# Patient Record
Sex: MALE | Race: WHITE | NOT HISPANIC OR LATINO | Employment: OTHER | URBAN - METROPOLITAN AREA
[De-identification: names, ages, dates, MRNs, and addresses within clinical notes are randomized per-mention and may not be internally consistent; named-entity substitution may affect disease eponyms.]

---

## 2018-05-08 ENCOUNTER — OFFICE VISIT (OUTPATIENT)
Dept: UROLOGY | Facility: CLINIC | Age: 73
End: 2018-05-08
Payer: MEDICARE

## 2018-05-08 VITALS
WEIGHT: 205 LBS | HEART RATE: 80 BPM | HEIGHT: 68 IN | BODY MASS INDEX: 31.07 KG/M2 | SYSTOLIC BLOOD PRESSURE: 110 MMHG | DIASTOLIC BLOOD PRESSURE: 64 MMHG

## 2018-05-08 DIAGNOSIS — R35.0 URINARY FREQUENCY: Primary | ICD-10-CM

## 2018-05-08 DIAGNOSIS — R39.15 URINARY URGENCY: ICD-10-CM

## 2018-05-08 DIAGNOSIS — R35.1 NOCTURIA: ICD-10-CM

## 2018-05-08 LAB
BILIRUB SERPL-MCNC: NORMAL MG/DL
BLOOD URINE, POC: NORMAL
COLOR, POC UA: YELLOW
GLUCOSE UR QL STRIP: NORMAL
KETONES UR QL STRIP: NORMAL
LEUKOCYTE ESTERASE URINE, POC: NORMAL
NITRITE, POC UA: NORMAL
PH, POC UA: 7
PROTEIN, POC: NORMAL
SPECIFIC GRAVITY, POC UA: 1.01
UROBILINOGEN, POC UA: NORMAL

## 2018-05-08 PROCEDURE — 99213 OFFICE O/P EST LOW 20 MIN: CPT | Mod: PBBFAC,PO | Performed by: UROLOGY

## 2018-05-08 PROCEDURE — 81002 URINALYSIS NONAUTO W/O SCOPE: CPT | Mod: PBBFAC,PO | Performed by: UROLOGY

## 2018-05-08 PROCEDURE — 99214 OFFICE O/P EST MOD 30 MIN: CPT | Mod: S$PBB,,, | Performed by: UROLOGY

## 2018-05-08 PROCEDURE — 99999 PR PBB SHADOW E&M-EST. PATIENT-LVL III: CPT | Mod: PBBFAC,,, | Performed by: UROLOGY

## 2018-05-08 RX ORDER — AMLODIPINE BESYLATE 5 MG/1
10 TABLET ORAL
COMMUNITY
Start: 2016-02-04

## 2018-05-08 RX ORDER — MULTIVITAMIN
TABLET ORAL
COMMUNITY

## 2018-05-08 RX ORDER — ALFUZOSIN HYDROCHLORIDE 10 MG/1
TABLET, EXTENDED RELEASE ORAL
COMMUNITY
Start: 2018-02-28 | End: 2021-11-09

## 2018-05-08 RX ORDER — OXYBUTYNIN CHLORIDE 10 MG/1
10 TABLET, EXTENDED RELEASE ORAL DAILY
Qty: 30 TABLET | Refills: 11 | Status: SHIPPED | OUTPATIENT
Start: 2018-05-08 | End: 2021-11-09

## 2018-05-08 RX ORDER — ASPIRIN 81 MG/1
81 TABLET ORAL
COMMUNITY

## 2018-05-08 RX ORDER — LOSARTAN POTASSIUM 100 MG/1
100 TABLET ORAL
COMMUNITY

## 2018-05-08 RX ORDER — ATORVASTATIN CALCIUM 10 MG/1
10 TABLET, FILM COATED ORAL
COMMUNITY
Start: 2016-03-07

## 2018-05-08 NOTE — PROGRESS NOTES
UROLOGY [place]  5 8 18    c-c urinary frequency    Age 72, comes because of urinary frequency. Was seen for frequent urination here by dr phipps two years ago, and was put on flomax, later on uroxatral. Pt says the frequency got better, and nocturia x 3-4. Pt says he has worsening nocturia x 4, and severe urinary frequency with urgency, which he didn't have before. Has not had problems with urge incontinence and does not wear a pad.     No pains or burning.    No pains or burning when voiding.     psa has been normal in the past.      PMH    Surgical:  has a past surgical history that includes Cardiac catheterization; tonsillectomy; and Colonoscopy.    Medical:  has a past medical history of Arthritis; HTN (hypertension); and Hyperlipidemia.    Familial: mother had uterine cancer, no fh of prostate ca or kidney stones    Social: lives in [place], [place].     Meds:   Uroxatral, norvasc, aspirin, lipitor, cozaar, theragran, fish oil      REVIEW OF SYSTEMS  GENERAL:  No headaches or dizzy spells.   HEENT: vision preserved. Sinuses: No complaints.   CARDIOPULMONARY: occasional swelling of the legs; no chest pain. No shortness of breath, no wheezing.   GASTROINTESTINAL: No heartburn. Denies diarrhea; slight constipation, no blood or mucus in stools.   GENITOURINARY: Denies dysuria, bleeding or incontinence.   MUSCULOSKELETAL: No arthritic complaints such as pain or stiffness.   PSYCHIATRIC: No history of depression or anxiety.   ENDOCRINOLOGIC: No reports of sweating, cold or heat intolerance. No polyuria or polydipsia.   NEUROLOGICAL: No dizziness, syncope, paralysis  LYMPHATICS: No enlarged nodes. No history of splenectomy.  ==    Pt alert, oriented, no distress  HEENT: wnl.  Neck: supple, no JVD, no lymphadenopathy  Chest: CV NSR  Lungs: normal chest expansion, no labored breathing  Abdomen flat, nontender, no organomegaly, no masses.  No hernias at this time but has a well-healing incision from recent hernia repair on  L  Penis circumcised, meatus nl  Testes nl, epi nl, scrotum nl  GUIDO: anus nl, sphincter nl tone, mucosa without lesions, prostate 30 gm, symmetric, no nodules or indurations  Extremities: no edema, peripheral pulses nl  Neuro: preserved    //IMP    bph on uroxatral, can continue on it.  Urinary frequency and urgency, overactive bladder  I am starting him on ditropan xl 10 mg po qd  RTC yearly

## 2019-05-13 DIAGNOSIS — H91.90 HEARING DIFFICULTY, UNSPECIFIED LATERALITY: Primary | ICD-10-CM

## 2019-05-14 ENCOUNTER — OFFICE VISIT (OUTPATIENT)
Dept: OTOLARYNGOLOGY | Facility: CLINIC | Age: 74
End: 2019-05-14
Payer: MEDICARE

## 2019-05-14 ENCOUNTER — CLINICAL SUPPORT (OUTPATIENT)
Dept: AUDIOLOGY | Facility: CLINIC | Age: 74
End: 2019-05-14
Payer: MEDICARE

## 2019-05-14 VITALS — HEIGHT: 68 IN | WEIGHT: 195.75 LBS | BODY MASS INDEX: 29.67 KG/M2

## 2019-05-14 DIAGNOSIS — H90.A11 CONDUCTIVE HEARING LOSS OF RIGHT EAR WITH RESTRICTED HEARING OF LEFT EAR: Primary | ICD-10-CM

## 2019-05-14 DIAGNOSIS — H90.A22 SENSORINEURAL HEARING LOSS (SNHL) OF LEFT EAR WITH RESTRICTED HEARING OF RIGHT EAR: ICD-10-CM

## 2019-05-14 DIAGNOSIS — H69.93 ETD (EUSTACHIAN TUBE DYSFUNCTION), BILATERAL: Primary | ICD-10-CM

## 2019-05-14 DIAGNOSIS — H90.6 MIXED CONDUCTIVE AND SENSORINEURAL HEARING LOSS, BILATERAL: ICD-10-CM

## 2019-05-14 PROCEDURE — 99203 PR OFFICE/OUTPT VISIT, NEW, LEVL III, 30-44 MIN: ICD-10-PCS | Mod: S$PBB,,, | Performed by: OTOLARYNGOLOGY

## 2019-05-14 PROCEDURE — 99999 PR PBB SHADOW E&M-EST. PATIENT-LVL II: CPT | Mod: PBBFAC,,, | Performed by: OTOLARYNGOLOGY

## 2019-05-14 PROCEDURE — 99999 PR PBB SHADOW E&M-EST. PATIENT-LVL I: ICD-10-PCS | Mod: PBBFAC,,,

## 2019-05-14 PROCEDURE — 99212 OFFICE O/P EST SF 10 MIN: CPT | Mod: PBBFAC,27,PO | Performed by: OTOLARYNGOLOGY

## 2019-05-14 PROCEDURE — 92557 COMPREHENSIVE HEARING TEST: CPT | Mod: PBBFAC,PO | Performed by: AUDIOLOGIST

## 2019-05-14 PROCEDURE — 99999 PR PBB SHADOW E&M-EST. PATIENT-LVL I: CPT | Mod: PBBFAC,,,

## 2019-05-14 PROCEDURE — 99999 PR PBB SHADOW E&M-EST. PATIENT-LVL II: ICD-10-PCS | Mod: PBBFAC,,, | Performed by: OTOLARYNGOLOGY

## 2019-05-14 PROCEDURE — 92567 TYMPANOMETRY: CPT | Mod: PBBFAC,PO | Performed by: AUDIOLOGIST

## 2019-05-14 PROCEDURE — 99211 OFF/OP EST MAY X REQ PHY/QHP: CPT | Mod: PBBFAC,PO,25

## 2019-05-14 PROCEDURE — 99203 OFFICE O/P NEW LOW 30 MIN: CPT | Mod: S$PBB,,, | Performed by: OTOLARYNGOLOGY

## 2019-05-14 NOTE — PROGRESS NOTES
Subjective:       Patient ID: Ken Giraldo is a 73 y.o. male.    Chief Complaint: Consult and Ear Fullness    Ken is here for ear issues.   Length of symptoms: many years, but worsening over the past few years. Has had acute change in hearing left side for 1 month.   Has had chronic ear issues since childhood, history of chronic otitis externa.   Has R>L hearing loss, chronically. Left ear has been acutely worse for the past month.  No allergies, no recent URI.   Therapies tried include: compounding medications by another ENT in [place] and [place]. Last seen 1.5 years ago.   Has been using a Saave which has worked in the past but not of recent.   No otalgia, no itching currently.        Social History     Tobacco Use   Smoking Status Never Smoker   Smokeless Tobacco Never Used     Social History     Substance and Sexual Activity   Alcohol Use No    Alcohol/week: 0.0 oz          Review of Systems   Constitutional: Negative for activity change and appetite change.   Eyes: Negative for discharge.   Respiratory: Negative for difficulty breathing and wheezing   Cardiovascular: Negative for chest pain.   Gastrointestinal: Negative for abdominal distention and abdominal pain.   Endocrine: Negative for cold intolerance and heat intolerance.   Genitourinary: Negative for dysuria.   Musculoskeletal: Negative for gait problem and joint swelling.   Skin: Negative for color change and pallor.   Neurological: Negative for syncope and weakness.   Psychiatric/Behavioral: Negative for agitation and confusion.     Objective:        Constitutional:   He is oriented to person, place, and time. He appears well-developed and well-nourished. He appears alert. He is active. Normal speech.      Head:  Normocephalic and atraumatic. Head is without TMJ tenderness. No scars. Salivary glands normal.  Facial strength is normal.      Ears:    Right Ear: No drainage or swelling. Tympanic membrane is retracted. No middle ear effusion.    Left Ear: No drainage or swelling. A middle ear effusion is present.   Ears:      Nose:  No mucosal edema, rhinorrhea or sinus tenderness. No turbinate hypertrophy.      Mouth/Throat  Oropharynx clear and moist without lesions or asymmetry, normal uvula midline and mirror exam normal. Normal dentition. No uvula swelling, lacerations or trismus. No oropharyngeal exudate. Tonsillar erythema, tonsillar exudate.      Neck:  Full range of motion with neck supple and no adenopathy. Thyroid tenderness is present. No tracheal deviation, no edema, no erythema, normal range of motion, no stridor, no crepitus and no neck rigidity present. No thyroid mass present.     Cardiovascular:   Intact distal pulses and normal pulses.      Pulmonary/Chest:   Effort normal and breath sounds normal. No stridor.     Psychiatric:   His speech is normal and behavior is normal. His mood appears not anxious. His affect is not labile.     Neurological:   He is alert and oriented to person, place, and time. No sensory deficit.     Skin:   No abrasions, lacerations, lesions, or rashes. No abrasion and no bruising noted.         Tests / Results:          Assessment:       1. ETD (Eustachian tube dysfunction), bilateral    2. Mixed conductive and sensorineural hearing loss, bilateral          Plan:       Otitis externa no issues today, controlled    Main issue is ETD and associated CHL. He does get improvement with insufflation. Has been worse for 1 mo. Suspect chronic but will give some time unless ear begins bothering him more. Fu 2 months, if persistent concerns in either or both ears, will proceed with tubes. Discussed that although R mixed HL (predominantly conductive) -no effusion  Today so possible ossicular component.    Continue auto-insufflation routinely.

## 2019-07-15 ENCOUNTER — OFFICE VISIT (OUTPATIENT)
Dept: OTOLARYNGOLOGY | Facility: CLINIC | Age: 74
End: 2019-07-15
Payer: MEDICARE

## 2019-07-15 VITALS — HEIGHT: 67 IN | BODY MASS INDEX: 32.8 KG/M2 | WEIGHT: 209 LBS

## 2019-07-15 DIAGNOSIS — H69.93 ETD (EUSTACHIAN TUBE DYSFUNCTION), BILATERAL: Primary | ICD-10-CM

## 2019-07-15 DIAGNOSIS — H60.392 OTHER INFECTIVE CHRONIC OTITIS EXTERNA OF LEFT EAR: ICD-10-CM

## 2019-07-15 DIAGNOSIS — H90.6 MIXED CONDUCTIVE AND SENSORINEURAL HEARING LOSS, BILATERAL: ICD-10-CM

## 2019-07-15 PROCEDURE — 99213 PR OFFICE/OUTPT VISIT, EST, LEVL III, 20-29 MIN: ICD-10-PCS | Mod: S$PBB,,, | Performed by: OTOLARYNGOLOGY

## 2019-07-15 PROCEDURE — 99213 OFFICE O/P EST LOW 20 MIN: CPT | Mod: S$PBB,,, | Performed by: OTOLARYNGOLOGY

## 2019-07-15 PROCEDURE — 99212 OFFICE O/P EST SF 10 MIN: CPT | Mod: PBBFAC,PO | Performed by: OTOLARYNGOLOGY

## 2019-07-15 PROCEDURE — 99999 PR PBB SHADOW E&M-EST. PATIENT-LVL II: ICD-10-PCS | Mod: PBBFAC,,, | Performed by: OTOLARYNGOLOGY

## 2019-07-15 PROCEDURE — 99999 PR PBB SHADOW E&M-EST. PATIENT-LVL II: CPT | Mod: PBBFAC,,, | Performed by: OTOLARYNGOLOGY

## 2019-07-15 RX ORDER — CELECOXIB 200 MG/1
200 CAPSULE ORAL DAILY
Refills: 0 | COMMUNITY
Start: 2019-06-14 | End: 2021-11-09

## 2019-07-15 RX ORDER — NEOMYCIN SULFATE, POLYMYXIN B SULFATE AND HYDROCORTISONE 10; 3.5; 1 MG/ML; MG/ML; [USP'U]/ML
3 SUSPENSION/ DROPS AURICULAR (OTIC) 3 TIMES DAILY
Qty: 6 ML | Refills: 0 | Status: SHIPPED | OUTPATIENT
Start: 2019-07-15 | End: 2019-07-25

## 2019-07-15 NOTE — PROGRESS NOTES
Subjective:       Patient ID: Ken Giraldo is a 73 y.o. male.    Chief Complaint: Follow-up    Ken is here for follow-up of eustachian tube dysfunction and right mixed hearing loss (suspected ossicular involvement). Also with history of C OE.  Ears a little better, but still bothersome fullness L>R. Constant. Unable to reliably auto-insufflate the left side. No pain. No otorrhea    Review of Systems   Constitutional: Negative for activity change and appetite change.   Respiratory: Negative for difficulty breathing and wheezing   Cardiovascular: Negative for chest pain.      Objective:        Constitutional:   Vital signs are normal. He appears well-developed and well-nourished.     Head:  Normocephalic and atraumatic.     Ears:  Right ear hearing normal to normal and whispered voice; external ear normal without scars, lesions, or masses; ear canal, tympanic membrane, and middle ear normal..   Left Ear: There is drainage and swelling. Tympanic membrane is retracted.   Ears:      Nose:  Nose normal including turbinates, nasal mucosa, sinuses and nasal septum.     Mouth/Throat  Oropharynx clear and moist without lesions or asymmetry.     Neck:  Neck normal without thyromegaly masses, asymmetry, normal tracheal structure, crepitus, and tenderness.         Tests / Results:  None    Assessment:       1. ETD (Eustachian tube dysfunction), bilateral    2. Mixed conductive and sensorineural hearing loss, bilateral    3. Other infective chronic otitis externa of left ear          Plan:       Cortisporin x 10 days  Keep ears dry   If persistent. Need to consider CT Temporal Bones andor topical   FU 6 weeks     R HA candidate.

## 2019-07-26 ENCOUNTER — TELEPHONE (OUTPATIENT)
Dept: OTOLARYNGOLOGY | Facility: CLINIC | Age: 74
End: 2019-07-26

## 2019-07-26 DIAGNOSIS — H90.6 MIXED CONDUCTIVE AND SENSORINEURAL HEARING LOSS, BILATERAL: Primary | ICD-10-CM

## 2019-07-26 DIAGNOSIS — H60.392 OTHER INFECTIVE CHRONIC OTITIS EXTERNA OF LEFT EAR: ICD-10-CM

## 2019-07-26 DIAGNOSIS — H69.93 ETD (EUSTACHIAN TUBE DYSFUNCTION), BILATERAL: ICD-10-CM

## 2019-07-26 NOTE — TELEPHONE ENCOUNTER
"Pt states that he had to stop the ear drops due to severe itching and "soupy' feeling in his ears. Pt states that he has started back using the bethameth 0.1% oint and the ear has improved some but is not normal. Pt is requesting advise from Dr. Marlow on something else to use. Last clinic note states CT temporal bone may be needed is no improvement. Please advise.  "

## 2019-07-26 NOTE — TELEPHONE ENCOUNTER
----- Message from Bautista An sent at 7/26/2019  8:26 AM CDT -----  Contact: patient   Patient need to speak with a nurse regarding a medication he was given for his ear is not working. Please call back to discuss at [phone] (home)

## 2019-07-31 ENCOUNTER — HOSPITAL ENCOUNTER (OUTPATIENT)
Dept: RADIOLOGY | Facility: HOSPITAL | Age: 74
Discharge: HOME | End: 2019-07-31
Attending: OTOLARYNGOLOGY
Payer: MEDICARE

## 2019-07-31 DIAGNOSIS — H69.93 ETD (EUSTACHIAN TUBE DYSFUNCTION), BILATERAL: ICD-10-CM

## 2019-07-31 DIAGNOSIS — H60.392 OTHER INFECTIVE CHRONIC OTITIS EXTERNA OF LEFT EAR: ICD-10-CM

## 2019-07-31 DIAGNOSIS — H90.6 MIXED CONDUCTIVE AND SENSORINEURAL HEARING LOSS, BILATERAL: ICD-10-CM

## 2019-07-31 PROCEDURE — 70480 CT TEMPORAL BONE WITHOUT CONTRAST: ICD-10-PCS | Mod: 26,,, | Performed by: RADIOLOGY

## 2019-07-31 PROCEDURE — 70480 CT ORBIT/EAR/FOSSA W/O DYE: CPT | Mod: TC,PO

## 2019-07-31 PROCEDURE — 70480 CT ORBIT/EAR/FOSSA W/O DYE: CPT | Mod: 26,,, | Performed by: RADIOLOGY

## 2019-07-31 RX ORDER — AMOXICILLIN AND CLAVULANATE POTASSIUM 875; 125 MG/1; MG/1
1 TABLET, FILM COATED ORAL 2 TIMES DAILY
Qty: 28 TABLET | Refills: 0 | Status: SHIPPED | OUTPATIENT
Start: 2019-07-31 | End: 2019-08-14

## 2019-07-31 NOTE — TELEPHONE ENCOUNTER
I spoke with the patient results reviewed he has the antibiotics.  He has a question he is having issues with vertigo in the morning seems to improve thru out the day.  Please advise

## 2019-08-05 NOTE — TELEPHONE ENCOUNTER
Scheduled pt for a DHP on 8/26/19 per his request to have the appt on the same day he sees Dr. Virgil Marlow due to distance traveled to clinic. Advised no vestibular suppressants for 24 hours prior. Confirmed date, time and location of appts. Understanding voiced.

## 2019-08-26 ENCOUNTER — OFFICE VISIT (OUTPATIENT)
Dept: OTOLARYNGOLOGY | Facility: CLINIC | Age: 74
End: 2019-08-26
Payer: MEDICARE

## 2019-08-26 DIAGNOSIS — H73.12 CHRONIC MYRINGITIS OF LEFT EAR: ICD-10-CM

## 2019-08-26 DIAGNOSIS — H69.93 ETD (EUSTACHIAN TUBE DYSFUNCTION), BILATERAL: Primary | ICD-10-CM

## 2019-08-26 DIAGNOSIS — H90.6 MIXED CONDUCTIVE AND SENSORINEURAL HEARING LOSS, BILATERAL: ICD-10-CM

## 2019-08-26 PROCEDURE — 69433 CREATE EARDRUM OPENING: CPT | Mod: 50,S$PBB,, | Performed by: OTOLARYNGOLOGY

## 2019-08-26 PROCEDURE — 69433 PR CREATE EARDRUM OPENING,LOCAL ANESTH: ICD-10-PCS | Mod: 50,S$PBB,, | Performed by: OTOLARYNGOLOGY

## 2019-08-26 PROCEDURE — 99999 PR PBB SHADOW E&M-EST. PATIENT-LVL I: ICD-10-PCS | Mod: PBBFAC,,, | Performed by: OTOLARYNGOLOGY

## 2019-08-26 PROCEDURE — 99999 PR PBB SHADOW E&M-EST. PATIENT-LVL I: CPT | Mod: PBBFAC,,, | Performed by: OTOLARYNGOLOGY

## 2019-08-26 PROCEDURE — 99214 PR OFFICE/OUTPT VISIT, EST, LEVL IV, 30-39 MIN: ICD-10-PCS | Mod: 25,S$PBB,, | Performed by: OTOLARYNGOLOGY

## 2019-08-26 PROCEDURE — 99214 OFFICE O/P EST MOD 30 MIN: CPT | Mod: 25,S$PBB,, | Performed by: OTOLARYNGOLOGY

## 2019-08-26 PROCEDURE — 99211 OFF/OP EST MAY X REQ PHY/QHP: CPT | Mod: PBBFAC,PO | Performed by: OTOLARYNGOLOGY

## 2019-08-26 PROCEDURE — 69433 CREATE EARDRUM OPENING: CPT | Mod: 50,PBBFAC,PO | Performed by: OTOLARYNGOLOGY

## 2019-08-26 NOTE — PROGRESS NOTES
Subjective:       Patient ID: Ken Giraldo is a 73 y.o. male.    Chief Complaint: No chief complaint on file.    Ken is here for follow-up of eustachian tube dysfunction, right mixed hearing loss (suspected ossicular involvement). Also with history of Left TRICE vs myringitis.  Since the oral antibiotic course following CT temporal bones, he has had no drainage, no pain, improvement in swelling.  Unable to reliably auto-insufflate the left side.  Still feels his hearing is down left greater than right.  He is able to insufflate the right side and this result in temporary hearing improvement.    He has never had tubes in the past.    Review of Systems   Constitutional: Negative for activity change and appetite change.   Respiratory: Negative for difficulty breathing and wheezing   Cardiovascular: Negative for chest pain.      Objective:        Constitutional:   Vital signs are normal. He appears well-developed and well-nourished.     Head:  Normocephalic and atraumatic.     Ears:    Right Ear: Tympanic membrane is retracted.   Left Ear: No drainage or swelling. Tympanic membrane is retracted.   Ears:      Nose:  Nose normal including turbinates, nasal mucosa, sinuses and nasal septum.     Mouth/Throat  Oropharynx clear and moist without lesions or asymmetry.     Neck:  Neck normal without thyromegaly masses, asymmetry, normal tracheal structure, crepitus, and tenderness.         Tests / Results:    I personally reviewed the CT Temporal Bones:  No cholesteatomas  Thickening in L Prussak's space with sharp scutum  More extensive chronic changes on right           Ken Giraldo   [id number],   : 1945      Procedure date:2019  Patient's medications, allergies, past medical, surgical, social and family histories were reviewed and updated as appropriate.    Pre-procedure diagnosis: ETD (Eustachian tube dysfunction), bilateral [H69.83]     Procedure: bilateral  tympanostomy, with ventilation tube placement      Procedure in detail: Risks, benefits, and alternatives of the procedure were discussed with the patient, and consent was obtained to perform afor both ears.  The procedure required a high level of expertise and use of an operating microscope and multiple micro-instruments.     With the patient in the supine position, the operating microscope was used to examine both ears with the appropriate sized ear speculum.  A variety of sterile, micro-instruments were utilized to remove the cerumen atraumatically.  We applied phenol topically (local analgesic) to the tympanic membrane.  After adequate anesthesia was obtained, the tympanic membrane was incised radially in the appropriate location.  We suctioned the middle ear through the incision. A ventilation tube was placed (position & patency confirmed).  I performed the procedure. The patient tolerated the procedure well and there were no complications.    Findings:   Right ear : the middle ear had no fluid present.    Left ear  : middle ear had scant serous fluid present. - mild amount of myringitis central TM, much improved from previous      Assessment:       1. ETD (Eustachian tube dysfunction), bilateral    2. Mixed conductive and sensorineural hearing loss, bilateral    3. Chronic myringitis of left ear          Plan:       Myringitis improved for now.  I did review the CT temporal bone.  He does have history of chronic ear disease and eustachian tube dysfunction.  I do not appreciate any cholesteatoma, however we will need to keep an eye on his ears.  He does have fairly significant longstanding retraction.    I did offer him myringotomy with tube plus or minus eustachian tube dilation.  He opted for bilateral tube placement today.      R HA candidate.     FU 1 mo, sooner as needed

## 2019-09-25 ENCOUNTER — TELEPHONE (OUTPATIENT)
Dept: OTOLARYNGOLOGY | Facility: CLINIC | Age: 74
End: 2019-09-25

## 2019-09-25 NOTE — TELEPHONE ENCOUNTER
----- Message from Jolene Nguyen sent at 9/25/2019 12:47 PM CDT -----  Contact: patient  Type:  Sooner Apoointment Request    Name of Caller:  Ken  When is the first available appointment?  10/25  Best Call Back Number:  [phone]  Additional Information: Patient has to reschedule appt for 9/30 due to wife having dr/ appointments same day/ First available in epic is 10/15- please call to advise for tube check appt

## 2019-10-02 ENCOUNTER — OFFICE VISIT (OUTPATIENT)
Dept: OTOLARYNGOLOGY | Facility: CLINIC | Age: 74
End: 2019-10-02
Payer: MEDICARE

## 2019-10-02 VITALS — BODY MASS INDEX: 32.28 KG/M2 | WEIGHT: 205.69 LBS | HEIGHT: 67 IN

## 2019-10-02 DIAGNOSIS — Z45.89 TYMPANOSTOMY TUBE CHECK: Primary | ICD-10-CM

## 2019-10-02 DIAGNOSIS — H90.A31 MIXED CONDUCTIVE AND SENSORINEURAL HEARING LOSS OF RIGHT EAR WITH RESTRICTED HEARING OF LEFT EAR: ICD-10-CM

## 2019-10-02 PROCEDURE — 99213 PR OFFICE/OUTPT VISIT, EST, LEVL III, 20-29 MIN: ICD-10-PCS | Mod: S$PBB,,, | Performed by: OTOLARYNGOLOGY

## 2019-10-02 PROCEDURE — 99999 PR PBB SHADOW E&M-EST. PATIENT-LVL II: CPT | Mod: PBBFAC,,, | Performed by: OTOLARYNGOLOGY

## 2019-10-02 PROCEDURE — 99213 OFFICE O/P EST LOW 20 MIN: CPT | Mod: S$PBB,,, | Performed by: OTOLARYNGOLOGY

## 2019-10-02 PROCEDURE — 99212 OFFICE O/P EST SF 10 MIN: CPT | Mod: PBBFAC,PO | Performed by: OTOLARYNGOLOGY

## 2019-10-02 PROCEDURE — 99999 PR PBB SHADOW E&M-EST. PATIENT-LVL II: ICD-10-PCS | Mod: PBBFAC,,, | Performed by: OTOLARYNGOLOGY

## 2021-01-22 ENCOUNTER — PATIENT MESSAGE (OUTPATIENT)
Dept: ADMINISTRATIVE | Facility: OTHER | Age: 76
End: 2021-01-22

## 2021-02-16 ENCOUNTER — PATIENT MESSAGE (OUTPATIENT)
Dept: ADMINISTRATIVE | Facility: CLINIC | Age: 76
End: 2021-02-16

## 2021-10-11 ENCOUNTER — IMMUNIZATION (OUTPATIENT)
Dept: FAMILY MEDICINE | Facility: CLINIC | Age: 76
End: 2021-10-11
Payer: MEDICARE

## 2021-10-11 DIAGNOSIS — Z23 NEED FOR VACCINATION: Primary | ICD-10-CM

## 2021-10-11 PROCEDURE — 91300 COVID-19, MRNA, LNP-S, PF, 30 MCG/0.3 ML DOSE VACCINE: CPT | Mod: PBBFAC,PO

## 2021-11-04 ENCOUNTER — TELEPHONE (OUTPATIENT)
Dept: OTOLARYNGOLOGY | Facility: CLINIC | Age: 76
End: 2021-11-04
Payer: COMMERCIAL

## 2021-11-09 ENCOUNTER — OFFICE VISIT (OUTPATIENT)
Dept: OTOLARYNGOLOGY | Facility: CLINIC | Age: 76
End: 2021-11-09
Payer: MEDICARE

## 2021-11-09 VITALS — WEIGHT: 211.44 LBS | BODY MASS INDEX: 33.11 KG/M2

## 2021-11-09 DIAGNOSIS — H69.93 ETD (EUSTACHIAN TUBE DYSFUNCTION), BILATERAL: ICD-10-CM

## 2021-11-09 DIAGNOSIS — H66.92 LEFT ACUTE OTITIS MEDIA: Primary | ICD-10-CM

## 2021-11-09 DIAGNOSIS — Z98.890 HISTORY OF TYMPANOSTOMY: ICD-10-CM

## 2021-11-09 PROCEDURE — 99999 PR PBB SHADOW E&M-EST. PATIENT-LVL III: ICD-10-PCS | Mod: PBBFAC,,, | Performed by: OTOLARYNGOLOGY

## 2021-11-09 PROCEDURE — 69210 REMOVE IMPACTED EAR WAX UNI: CPT | Mod: PBBFAC,PO | Performed by: OTOLARYNGOLOGY

## 2021-11-09 PROCEDURE — 99213 PR OFFICE/OUTPT VISIT, EST, LEVL III, 20-29 MIN: ICD-10-PCS | Mod: 25,S$PBB,, | Performed by: OTOLARYNGOLOGY

## 2021-11-09 PROCEDURE — 99213 OFFICE O/P EST LOW 20 MIN: CPT | Mod: 25,S$PBB,, | Performed by: OTOLARYNGOLOGY

## 2021-11-09 PROCEDURE — 99999 PR PBB SHADOW E&M-EST. PATIENT-LVL III: CPT | Mod: PBBFAC,,, | Performed by: OTOLARYNGOLOGY

## 2021-11-09 PROCEDURE — 99213 OFFICE O/P EST LOW 20 MIN: CPT | Mod: PBBFAC,PO | Performed by: OTOLARYNGOLOGY

## 2021-11-09 PROCEDURE — 69210 PR REMOVAL IMPACTED CERUMEN REQUIRING INSTRUMENTATION, UNILATERAL: ICD-10-PCS | Mod: S$PBB,,, | Performed by: OTOLARYNGOLOGY

## 2021-11-09 PROCEDURE — 69210 REMOVE IMPACTED EAR WAX UNI: CPT | Mod: S$PBB,,, | Performed by: OTOLARYNGOLOGY

## 2021-11-09 RX ORDER — CIPROFLOXACIN HYDROCHLORIDE 3 MG/ML
3 SOLUTION/ DROPS OPHTHALMIC 2 TIMES DAILY
Qty: 10 ML | Refills: 2 | Status: SHIPPED | OUTPATIENT
Start: 2021-11-09 | End: 2021-11-23

## 2021-11-09 RX ORDER — MEMANTINE HYDROCHLORIDE 10 MG/1
TABLET ORAL
COMMUNITY
Start: 2021-10-30

## 2021-11-09 RX ORDER — TAMSULOSIN HYDROCHLORIDE 0.4 MG/1
0.4 CAPSULE ORAL
COMMUNITY
Start: 2021-09-02

## 2021-11-09 RX ORDER — TAMSULOSIN HYDROCHLORIDE 0.4 MG/1
CAPSULE ORAL
COMMUNITY
Start: 2021-09-02

## 2021-12-01 ENCOUNTER — IMMUNIZATION (OUTPATIENT)
Dept: FAMILY MEDICINE | Facility: CLINIC | Age: 76
End: 2021-12-01
Payer: MEDICARE

## 2021-12-01 ENCOUNTER — OFFICE VISIT (OUTPATIENT)
Dept: OTOLARYNGOLOGY | Facility: CLINIC | Age: 76
End: 2021-12-01
Payer: MEDICARE

## 2021-12-01 VITALS — WEIGHT: 212.94 LBS | BODY MASS INDEX: 33.35 KG/M2

## 2021-12-01 DIAGNOSIS — H60.63 CHRONIC NON-INFECTIVE OTITIS EXTERNA OF BOTH EARS, UNSPECIFIED TYPE: ICD-10-CM

## 2021-12-01 DIAGNOSIS — H69.93 ETD (EUSTACHIAN TUBE DYSFUNCTION), BILATERAL: Primary | ICD-10-CM

## 2021-12-01 DIAGNOSIS — H72.92 PERFORATION OF LEFT TYMPANIC MEMBRANE: ICD-10-CM

## 2021-12-01 PROCEDURE — 99999 PR PBB SHADOW E&M-EST. PATIENT-LVL III: ICD-10-PCS | Mod: PBBFAC,,, | Performed by: OTOLARYNGOLOGY

## 2021-12-01 PROCEDURE — 99213 OFFICE O/P EST LOW 20 MIN: CPT | Mod: PBBFAC,25,PO | Performed by: OTOLARYNGOLOGY

## 2021-12-01 PROCEDURE — G0008 ADMIN INFLUENZA VIRUS VAC: HCPCS | Mod: PBBFAC,PO

## 2021-12-01 PROCEDURE — 90694 VACC AIIV4 NO PRSRV 0.5ML IM: CPT | Mod: PBBFAC,PO

## 2021-12-01 PROCEDURE — 99999 PR PBB SHADOW E&M-EST. PATIENT-LVL III: CPT | Mod: PBBFAC,,, | Performed by: OTOLARYNGOLOGY

## 2021-12-01 PROCEDURE — 99214 OFFICE O/P EST MOD 30 MIN: CPT | Mod: S$PBB,,, | Performed by: OTOLARYNGOLOGY

## 2021-12-01 PROCEDURE — 99214 PR OFFICE/OUTPT VISIT, EST, LEVL IV, 30-39 MIN: ICD-10-PCS | Mod: S$PBB,,, | Performed by: OTOLARYNGOLOGY

## 2021-12-22 ENCOUNTER — OFFICE VISIT (OUTPATIENT)
Dept: OTOLARYNGOLOGY | Facility: CLINIC | Age: 76
End: 2021-12-22
Payer: MEDICARE

## 2021-12-22 ENCOUNTER — CLINICAL SUPPORT (OUTPATIENT)
Dept: AUDIOLOGY | Facility: CLINIC | Age: 76
End: 2021-12-22
Payer: MEDICARE

## 2021-12-22 VITALS — BODY MASS INDEX: 32.98 KG/M2 | WEIGHT: 210.56 LBS

## 2021-12-22 DIAGNOSIS — H90.6 MIXED HEARING LOSS, BILATERAL: Primary | ICD-10-CM

## 2021-12-22 DIAGNOSIS — H72.92 TYMPANIC MEMBRANE PERFORATION, LEFT: ICD-10-CM

## 2021-12-22 DIAGNOSIS — H69.93 ETD (EUSTACHIAN TUBE DYSFUNCTION), BILATERAL: Primary | ICD-10-CM

## 2021-12-22 DIAGNOSIS — H72.92 PERFORATION OF LEFT TYMPANIC MEMBRANE: ICD-10-CM

## 2021-12-22 DIAGNOSIS — H90.A31 MIXED CONDUCTIVE AND SENSORINEURAL HEARING LOSS OF RIGHT EAR WITH RESTRICTED HEARING OF LEFT EAR: ICD-10-CM

## 2021-12-22 PROCEDURE — 92557 COMPREHENSIVE HEARING TEST: CPT | Mod: PBBFAC,PO | Performed by: AUDIOLOGIST

## 2021-12-22 PROCEDURE — 99999 PR PBB SHADOW E&M-EST. PATIENT-LVL III: CPT | Mod: PBBFAC,,, | Performed by: OTOLARYNGOLOGY

## 2021-12-22 PROCEDURE — 99211 OFF/OP EST MAY X REQ PHY/QHP: CPT | Mod: PBBFAC,PO

## 2021-12-22 PROCEDURE — 99213 OFFICE O/P EST LOW 20 MIN: CPT | Mod: S$PBB,,, | Performed by: OTOLARYNGOLOGY

## 2021-12-22 PROCEDURE — 99999 PR PBB SHADOW E&M-EST. PATIENT-LVL III: ICD-10-PCS | Mod: PBBFAC,,, | Performed by: OTOLARYNGOLOGY

## 2021-12-22 PROCEDURE — 99213 OFFICE O/P EST LOW 20 MIN: CPT | Mod: PBBFAC,27,PO | Performed by: OTOLARYNGOLOGY

## 2021-12-22 PROCEDURE — 99999 PR PBB SHADOW E&M-EST. PATIENT-LVL I: CPT | Mod: PBBFAC,,,

## 2021-12-22 PROCEDURE — 99999 PR PBB SHADOW E&M-EST. PATIENT-LVL I: ICD-10-PCS | Mod: PBBFAC,,,

## 2021-12-22 PROCEDURE — 99213 PR OFFICE/OUTPT VISIT, EST, LEVL III, 20-29 MIN: ICD-10-PCS | Mod: S$PBB,,, | Performed by: OTOLARYNGOLOGY

## 2021-12-22 PROCEDURE — 92567 TYMPANOMETRY: CPT | Mod: PBBFAC,PO | Performed by: AUDIOLOGIST

## 2022-01-05 NOTE — PROGRESS NOTES
Subjective:       Patient ID: Ken Giraldo is a 76 y.o. male.    Chief Complaint: 3wk rafy ear (Not much improvement )    Ken is here for follow-up L AOM.     History of eustachian tube dysfunction, right mixed hearing loss (suspected ossicular involvement). Also with history of Left TRICE vs myringitis. I initially saw him for the Chronic OE issues. He had bee on  A variety of topical medications without improvement. We had finally gotten help with Cortisporin.     Last visit had tubes extruded and L otorrhea. floxin started then subsequently started on [facility]. Here for recheck.          Patient validated questionnaires (if applicable):      %       No flowsheet data found.  No flowsheet data found.  No flowsheet data found.         Review of Systems   Constitutional: Negative for activity change and appetite change.   Respiratory: Negative for difficulty breathing and wheezing   Cardiovascular: Negative for chest pain.      Objective:        Constitutional:   Vital signs are normal. He appears well-developed and well-nourished.     Head:  Normocephalic and atraumatic.     Ears:    Right Ear: No drainage. No middle ear effusion. Decreased hearing is noted.   Left Ear: There is drainage (cerimunous and keratin debris in canal,). Decreased hearing is noted.   Infection AS much improved, canal improved. No otorrhea.         Tests / Results:          Assessment:       1. ETD (Eustachian tube dysfunction), bilateral    2. Perforation of left tympanic membrane    3. Mixed conductive and sensorineural hearing loss of right ear with restricted hearing of left ear          Plan:       Topical arts x 3 weeks for OE has improved. Ear much better. Has perforation AS, but this is aerating. Would not recommend tympanoplasty at this time as his hearing is relatively unaffected  RTC 6 mos

## 2024-02-01 NOTE — PROGRESS NOTES
No new labs. Continue taking atorvastatin 20mg every day.  Labs to be drawn in 6 mo   Subjective:       Patient ID: Ken Giraldo is a 73 y.o. male.    Chief Complaint: Follow-up    Ken is here for follow-up of eustachian tube dysfunction, right mixed hearing loss (suspected ossicular involvement). Also with history of Left TRICE vs myringitis.      Had bilateral tubes 1 mo ago. No significant improvement in subjective hearing. Mild improvement in fullness on the left.    Review of Systems   Constitutional: Negative for activity change and appetite change.   Respiratory: Negative for difficulty breathing and wheezing   Cardiovascular: Negative for chest pain.      Objective:        Constitutional:   Vital signs are normal. He appears well-developed and well-nourished.     Head:  Normocephalic and atraumatic.     Ears:    Right Ear: Tympanic membrane is not retracted. A PE tube (patent) is seen.   Left Ear: No drainage or swelling. Tympanic membrane is not retracted. A PE tube (patent) is seen.   Ears:      Nose:  Nose normal including turbinates, nasal mucosa, sinuses and nasal septum.     Mouth/Throat  Oropharynx clear and moist without lesions or asymmetry.     Neck:  Neck normal without thyromegaly masses, asymmetry, normal tracheal structure, crepitus, and tenderness.         Tests / Results:    I personally reviewed the CT Temporal Bones:  No cholesteatomas  Thickening in L Prussak's space with sharp scutum  More extensive chronic changes on right      Assessment:       1. Tympanostomy tube check    2. Mixed conductive and sensorineural hearing loss of right ear with restricted hearing of left ear          Plan:       Tubes doing well - mild improvement but no subjective improvement in hearing. Would rec repeat audio prior to getting HA.  Continue monitoring - he is a hearing aid candidate   FU 6 mo tube check, sooner as needed